# Patient Record
Sex: FEMALE | Employment: UNEMPLOYED | URBAN - METROPOLITAN AREA
[De-identification: names, ages, dates, MRNs, and addresses within clinical notes are randomized per-mention and may not be internally consistent; named-entity substitution may affect disease eponyms.]

---

## 2020-09-28 ENCOUNTER — VIRTUAL VISIT (OUTPATIENT)
Dept: FAMILY MEDICINE CLINIC | Age: 35
End: 2020-09-28
Payer: COMMERCIAL

## 2020-09-28 PROCEDURE — G8428 CUR MEDS NOT DOCUMENT: HCPCS | Performed by: NURSE PRACTITIONER

## 2020-09-28 PROCEDURE — 99205 OFFICE O/P NEW HI 60 MIN: CPT | Performed by: NURSE PRACTITIONER

## 2020-09-28 ASSESSMENT — ENCOUNTER SYMPTOMS
ABDOMINAL DISTENTION: 0
COLOR CHANGE: 0
CHOKING: 0
BLOOD IN STOOL: 0
CHEST TIGHTNESS: 0
DIARRHEA: 1
FACIAL SWELLING: 0
STRIDOR: 0
SINUS PRESSURE: 1
WHEEZING: 0
VOICE CHANGE: 0
RHINORRHEA: 1
ABDOMINAL PAIN: 0
PHOTOPHOBIA: 1
SINUS PAIN: 1
NAUSEA: 1
EYE DISCHARGE: 0
EYE REDNESS: 0
RECTAL PAIN: 0
SORE THROAT: 0
COUGH: 0
EYE PAIN: 0
SHORTNESS OF BREATH: 0
VOMITING: 1
TROUBLE SWALLOWING: 1
APNEA: 0
RESPIRATORY NEGATIVE: 1
BACK PAIN: 0
EYE ITCHING: 0
CONSTIPATION: 0
ANAL BLEEDING: 0

## 2020-09-28 NOTE — PROGRESS NOTES
2020    TELEHEALTH EVALUATION -- Audio/Visual (During FZWK[facility]-75 public health emergency)    HPI:    Vinnielizett Lu (:  1985) has requested an audio/video evaluation for the following concern(s):establish care    Ms Concepcion Alvarez presents today w multiple problems. She does not have a primary provider and instead has sought care thru the ED or clinics. In  she underwent gastroparesis and unfortunately did not follow thru with any aftercare. Admittedly she dies not take good care of her health. Post procedure she developed diabetes and has had BS in the high 400's. She borjas not check her sugars at home because she does not like needles. IN fact she does not like doctors. During the time shortly after the gastro she had to have her gallbladder removed and her teethe were falling out. Poor nutrition played a large part of this. She is a non-smoker  She has also had vision problems most likely form the diabetes which is not controlled. Never went to an opthalmology. She has N/v/d when she takes in any Mil products, both lactose or almond. She becomes violently ill. She develops what sounds like fungal rashes around her bikini line, buttocks and up the side of her neck. She puts neosporin cr on the rash and it lightens up then she covers it with make up. She has been coughing up white matter form the back of her throat. She was told she needed her tosills out never had the surgery and since has had this white matter comng from her throat. She tells me the back of her throat has\"a bunch of holes in it\"  She has had a tubal ligation.'  She was diagnosed w bi-polar disorder and given Seroquel and slept for 3 days. When she woke she threw the medicine away. She states she can't sleep like that w three children    Review of Systems   Constitutional: Positive for fatigue. Negative for activity change, appetite change, chills, diaphoresis, fever and unexpected weight change.         Gastroparesis \"06   HENT: hallucinations, self-injury, sleep disturbance and suicidal ideas. The patient is not nervous/anxious and is not hyperactive. Bi-polar disorder can't take seroquel       Prior to Visit Medications    Not on File       Social History     Tobacco Use    Smoking status: Not on file   Substance Use Topics    Alcohol use: Not on file    Drug use: Not on file            PHYSICAL EXAMINATION:  [ INSTRUCTIONS:  \"[x]\" Indicates a positive item  \"[]\" Indicates a negative item  -- DELETE ALL ITEMS NOT EXAMINED]  Vital Signs: (As obtained by patient/caregiver or practitioner observation)    Blood pressure-  Heart rate-    Respiratory rate-    Temperature-  Pulse oximetry-     Constitutional: [x] Appears well-developed and well-nourished [] No apparent distress      [] Abnormal-   Mental status  [x] Alert and awake  [x] Oriented to person/place/time [x]Able to follow commands      Eyes:  EOM    [x]  Normal  [] Abnormal-  Sclera  [x]  Normal  [] Abnormal -         Discharge [x]  None visible  [] Abnormal -    HENT:   [x] Normocephalic, atraumatic. [] Abnormal   [] Mouth/Throat: Mucous membranes are moist.     External Ears [x] Normal  [] Abnormal-     Neck: [] No visualized mass     Pulmonary/Chest: [x] Respiratory effort normal.  [x] No visualized signs of difficulty breathing or respiratory distress        [] Abnormal-      Musculoskeletal:   [] Normal gait with no signs of ataxia         [x] Normal range of motion of neck        [] Abnormal-       Neurological:        [] No Facial Asymmetry (Cranial nerve 7 motor function) (limited exam to video visit)          [x] No gaze palsy        [] Abnormal-         Skin:        [x] No significant exanthematous lesions or discoloration noted on facial skin         [] Abnormal-            Psychiatric:       [x] Normal Affect [] No Hallucinations        [] Abnormal-     Other pertinent observable physical exam findings-     ASSESSMENT/PLAN:  1. Establish care/physical/labs  Mammogram, Tdap and flu shot  2. Diabetes-start on Metformin awaiting lab result  Needs Opthamology referral as well as podiatry  3. Poor dentition-dentist  4. Spitting white matter from throat, tonsils infected? Needs ref to ENT  5. N/v/d after any milk product even almond milk, doesn't know what to eat after gastroparesis  Needs ref to GI  6. Possible fungal infection bikini  Line and buttocks-need ov to observe, may need cultures and antifungal  7. Bi-polar disorder-unable to tolerate Seroquel 2/2 to sedation need to find one that has less sedating properties    Spoke to pt about the referrals as well as making an appointment for a physical and lab work    Zina Nageotte is a 38 y.o. female being evaluated by a Virtual Visit (video visit) encounter to address concerns as mentioned above. A caregiver was present when appropriate. Due to this being a TeleHealth encounter (During [facility] public health emergency), evaluation of the following organ systems was limited: Vitals/Constitutional/EENT/Resp/CV/GI//MS/Neuro/Skin/Heme-Lymph-Imm. Pursuant to the emergency declaration under the [address] [place] [address] authority and the [employer] and [employer] Act, this Virtual Visit was conducted with patient's (and/or legal guardian's) consent, to reduce the patient's risk of exposure to COVID-19 and provide necessary medical care. The patient (and/or legal guardian) has also been advised to contact this office for worsening conditions or problems, and seek emergency medical treatment and/or call 911 if deemed necessary. Patient identification was verified at the start of the visit: Yes    Total time spent on this encounter: 39    Services were provided through a video synchronous discussion virtually to substitute for in-person clinic visit. Patient and provider were located at their individual homes.     --Gemma Larkin Rona Noland - CNP on 9/28/2020 at 12:19 PM    An electronic signature was used to authenticate this note.

## 2021-06-09 ENCOUNTER — HOSPITAL ENCOUNTER (EMERGENCY)
Age: 36
Discharge: HOME | End: 2021-06-09
Payer: COMMERCIAL

## 2021-06-09 VITALS
RESPIRATION RATE: 14 BRPM | TEMPERATURE: 98.5 F | DIASTOLIC BLOOD PRESSURE: 66 MMHG | HEART RATE: 88 BPM | OXYGEN SATURATION: 100 % | SYSTOLIC BLOOD PRESSURE: 142 MMHG

## 2021-06-09 DIAGNOSIS — N89.8 VAGINAL IRRITATION: Primary | ICD-10-CM

## 2021-06-09 DIAGNOSIS — R10.2 VULVAR PAIN: ICD-10-CM

## 2021-06-09 LAB
BACTERIA WET PREP: NORMAL
CLUE CELLS: NORMAL
EPITHELIAL CELLS WET PREP: NORMAL
RBC WET PREP: NORMAL
SOURCE WET PREP: NORMAL
TRICHOMONAS PREP: NORMAL
WBC WET PREP: NORMAL
YEAST WET PREP: NORMAL

## 2021-06-09 PROCEDURE — 6370000000 HC RX 637 (ALT 250 FOR IP): Performed by: NURSE PRACTITIONER

## 2021-06-09 PROCEDURE — 87591 N.GONORRHOEAE DNA AMP PROB: CPT

## 2021-06-09 PROCEDURE — 87210 SMEAR WET MOUNT SALINE/INK: CPT

## 2021-06-09 PROCEDURE — 87491 CHLMYD TRACH DNA AMP PROBE: CPT

## 2021-06-09 PROCEDURE — 6360000002 HC RX W HCPCS: Performed by: NURSE PRACTITIONER

## 2021-06-09 PROCEDURE — 96372 THER/PROPH/DIAG INJ SC/IM: CPT

## 2021-06-09 PROCEDURE — 99284 EMERGENCY DEPT VISIT MOD MDM: CPT

## 2021-06-09 RX ORDER — DOXYCYCLINE HYCLATE 100 MG
100 TABLET ORAL ONCE
Status: COMPLETED | OUTPATIENT
Start: 2021-06-09 | End: 2021-06-09

## 2021-06-09 RX ORDER — DOXYCYCLINE 100 MG/1
100 TABLET ORAL 2 TIMES DAILY
Qty: 14 TABLET | Refills: 0 | Status: SHIPPED | OUTPATIENT
Start: 2021-06-09 | End: 2021-06-16

## 2021-06-09 RX ORDER — CEFTRIAXONE 1 G/1
500 INJECTION, POWDER, FOR SOLUTION INTRAMUSCULAR; INTRAVENOUS ONCE
Status: COMPLETED | OUTPATIENT
Start: 2021-06-09 | End: 2021-06-09

## 2021-06-09 RX ADMIN — CEFTRIAXONE SODIUM 500 MG: 1 INJECTION, POWDER, FOR SOLUTION INTRAMUSCULAR; INTRAVENOUS at 22:27

## 2021-06-09 RX ADMIN — DOXYCYCLINE HYCLATE 100 MG: 100 TABLET, COATED ORAL at 22:27

## 2021-06-09 ASSESSMENT — PAIN DESCRIPTION - ONSET: ONSET: GRADUAL

## 2021-06-09 ASSESSMENT — PAIN DESCRIPTION - PAIN TYPE: TYPE: ACUTE PAIN

## 2021-06-09 ASSESSMENT — PAIN DESCRIPTION - PROGRESSION: CLINICAL_PROGRESSION: NOT CHANGED

## 2021-06-09 ASSESSMENT — PAIN DESCRIPTION - FREQUENCY: FREQUENCY: CONTINUOUS

## 2021-06-09 ASSESSMENT — PAIN DESCRIPTION - LOCATION: LOCATION: VAGINA

## 2021-06-09 ASSESSMENT — PAIN SCALES - GENERAL: PAINLEVEL_OUTOF10: 6

## 2021-06-10 LAB
C TRACH DNA GENITAL QL NAA+PROBE: NEGATIVE
N. GONORRHOEAE DNA: NEGATIVE

## 2021-06-10 PROCEDURE — 87491 CHLMYD TRACH DNA AMP PROBE: CPT

## 2021-06-10 PROCEDURE — 87591 N.GONORRHOEAE DNA AMP PROB: CPT

## 2021-06-10 NOTE — ED NOTES
Assisted [facility] NP at bedside with Pelvic Exam.     Kirk Stanton, AMANDAN  59/71/41 [id number]

## 2021-06-10 NOTE — ED NOTES
Pt scripts x1 instructed to follow up with PCP. Assessed per Nikki Aviles NP.      Jalen Darnell, JOHNSON  01/90/02 8918

## 2021-06-10 NOTE — ED PROVIDER NOTES
Evaluated by [id number] [facility] Provider    [facility]  ED    CHIEF COMPLAINT  Vaginal Itching (Pt reports buying a new body wash and deoderant for her vaginal and woke up this morning with \"blisters\" and itching/ burning on her vaginal.) and Other    HISTORY OF PRESENT ILLNESS  Nisa Royal is a 38 y.o. female who presents to the ED complaining of vaginal itching and pain. She got new body wash and new \"who ha\" spray 3 days ago, it started burning when she was using the body wash, it started to burn and she rinsed it off quickly, she used the vaginal spray and it burned as well. She finally looked at it this morning because it hurt to walk and wipe. She looked at it today and it looks like there are blisters. She is also having itching. Denies vaginal discharge. Thought she had a yeast infection and used a monistat suppository and the insertion device had blood on it and discharge, she has just gotten off her period. She is concerned for STI. Denies abdominal pain. The patient is currently rating their pain as 6/10 and describes it as an burning and itching type of pain. Treatments tried prior to arrival in the ED: above. The patient arrived to the ED via private car. PAST MEDICAL HISTORY    History reviewed. No pertinent past medical history. SURGICAL HISTORY    History reviewed. No pertinent surgical history. CURRENT MEDICATIONS        ALLERGIES    No Known Allergies    FAMILY HISTORY    History reviewed. No pertinent family history.     SOCIAL HISTORY    Social History     Socioeconomic History    Marital status: Single     Spouse name: None    Number of children: None    Years of education: None    Highest education level: None   Occupational History    None   Tobacco Use    Smoking status: Never Smoker    Smokeless tobacco: Never Used   Substance and Sexual Activity    Alcohol use: Not Currently    Drug use: Not Currently    Sexual activity: Yes     Partners: Male Other Topics Concern    None   Social History Narrative    None     Social Determinants of Health     Financial Resource Strain:     Difficulty of Paying Living Expenses:    Food Insecurity:     Worried About Running Out of Food in the Last Year:     [address] in the Last Year:    Transportation Needs:     Lack of Transportation (Medical):  Lack of Transportation (Non-Medical):    Physical Activity:     Days of Exercise per Week:     Minutes of Exercise per Session:    Stress:     Feeling of Stress :    Social Connections:     Frequency of Communication with Friends and Family:     Frequency of Social Gatherings with Friends and Family:     Attends Zoroast[employer] Services:     Active Member of Clubs or Organizations:     Attends Club or Organization Meetings:     Marital Status:    Intimate Partner Violence:     Fear of Current or Ex-Partner:     Emotionally Abused:     Physically Abused:     Sexually Abused:        REVIEW OFSYSTEMS    10systems reviewed, pertinent positives per HPI otherwise noted to be negative. PHYSICAL EXAM  Physical Exam  Vitals:    06/09/21 2122   BP: (!) 142/66   Pulse: 88   Resp:    Temp:    SpO2: 100%     GENERAL: Patient is well-developed, well-nourished. Awake andalert. Cooperative. Resting in bed. No apparent distress. HEENT:  Normocephalic, atraumatic. Conjunctivaappear normal. Sclera is non-icteric. External ears are normal.    NECK: Supple with normal ROM. Tracheamidline  LUNGS: Equal and symmetric chest rise. Breathing is unlabored. Speaking comfortably in fullsentences. Lungs are clear bilaterally to auscultation. Without wheezing, rales, or rhonchi. CADIOVASCULAR:  Regular rate and rhythm. Normal S1-S2 sounds. No murmurs, rubs, or gallops. GI: Soft, non-tender to palpation, nondistended with positive bowel sounds. No rebound tenderness, guarding or rigidity. Negative Yanez's sign. Negative Rovsing's sign. No CVAT to palpation.  No masses or hepatosplenomegaly to palpation. : Pelvic exam: VULVA: normal appearing vulva with no masses, tenderness or lesions, VAGINA: color appears normal, thin white vaginal discharge, vaginal lesion there are scattered ulcers noted to the mucosal tissue at the vaginal introitus, WET MOUNT done - results: negative for pathogens, normal epithelial cells, CERVIX: normal appearing cervix without discharge or lesions, DNA probe for chlamydia and GC obtained. Bimanual not performed due to patient's level of pain with speculum exam.  MUSCULOSKELETAL:  No gross deformities or trauma noted. Moving all extremities equally and appropriately. Normal ROM. SKIN: Warm/dry. Skin is intact. No rashes or lesions noted. PSYCHIATRIC: Mood and affect appropriate. Speech is clear and articulate. NEUROLOGIC: Alert and oriented. No focal motor or sensory deficits. Gait was observed and is normal.    LABS   Results for orders placed or performed during the hospital encounter of 06/09/21   Wet prep, genital    Specimen: Vaginal   Result Value Ref Range    Trichomonas Prep None Seen     Yeast, Wet Prep None Seen     Clue Cells, Wet Prep None Seen     WBC, Wet Prep <1+     RBC, Wet Prep None Seen     Epi Cells 1+     Bacteria 2+     Source Wet Prep Vaginal        RADIOLOGY    No results found. ED COURSE/MDM  Patient seen and evaluated. Old records reviewed. Diagnostic testing reviewed and results discussed. I have evaluated this patient. My supervising physician was available for consultation. Merline Hernandez presented to the ED today with above noted complaints. Physical exam did not reveal reproducible abdominal tenderness. On pelvic exam she did appear to have small ulcerations noted to the mucosal tissue at the vaginal introitus. There was a thin white vaginal discharge. Cervix appears unremarkable. I did obtain wet prep and swab for gonorrhea and chlamydia. Patient stated she wanted to be tested for STIs.   Wet prep was negative. I do not feel that patient's appearance is consistent with herpes infection as she did not have any lesions to the vulva. I think this is more related to a contact dermatitis from the body spray and baths gel that she used. I discussed with the patient that she could do sits baths or use a squirt bottle with warm water to the area to help with pain control. I advised her on only using water and not sitting in a tub or bath and water that is not moving. I also told her not to use any type of chemical or soaps to the area and only use water until her symptoms have resolved. I feel that this will be self-limiting and resolve on its own. I also instructed the patient that she could try a zinc-based diaper rash cream as this would be protecting to the areas that are painful. She does not currently have a gynecologist and I did give her a referral upon discharge and advised her to follow-up with them if her symptoms or not improving. Patient verbalized her agreement and understanding of this plan. Patient did warrant prophylactic treatment for STI, she was given this prior to discharge. Pt was given the following medications or treatments in the ED:   Medications   cefTRIAXone (ROCEPHIN) injection 500 mg (500 mg Intramuscular Given 6/9/21 2227)   doxycycline hyclate (VIBRA-TABS) tablet 100 mg (100 mg Oral Given 6/9/21 2227)       At this point I do not feel the patient requires further work upand it is reasonable to discharge the patient. Please refer to AVS for further details regarding discharge instructions. A discussion was had with the patient regarding diagnosis, diagnostic testing results,treatment/ plan of care, and follow up. All questions were answered. Patient will follow up as directed for further evaluation/treatment. The patient was given strict return precautions as we discussed symptoms that wouldnecessitate return to the ED.  Patient will return to ED for new/worsening symptoms. The patient verbalized their understanding and agreement with the above plan. Patient was sent home with a prescription for below medication/s. I did [place] patient on appropriate use of these medication. Discharge Medication List as of 6/9/2021 10:23 PM      START taking these medications    Details   doxycycline monohydrate (ADOXA) 100 MG tablet Take 1 tablet by mouth 2 times daily for 7 days, Disp-14 tablet, R-0Print             I estimatethere is LOW risk for ACUTE APPENDICITIS, BOWEL OBSTRUCTION, CHOLECYSTITIS, DIVERTICULITIS, INCARCERATED HERNIA, PANCREATITIS, or PERFORATED BOWEL or ULCER, thus I consider the discharge disposition reasonable. Also, thereis no evidence or peritonitis, sepsis, or toxicity. Fei Renteria and I have discussed the diagnosis and risks, and we agree with discharging home to follow-up with their primary doctor. We also discussed returning to the EmergencyDepartment immediately if new or worsening symptoms occur. We have discussed the symptoms which are most concerning (e.g., bloody stool, fever, changing or worsening pain, vomiting) that necessitate immediate return. CLINICAL IMPRESSION    1. Vaginal irritation    2. Vulvar pain           Discharge Vitals:  Blood pressure (!) 142/66, pulse 88, temperature 98.5 °F (36.9 °C), temperature source Oral, resp. rate 14, last menstrual period 06/01/2021, SpO2 100 %. FOLLOW UP  Rogers Salmon DO  [address] [place]mery Hernandes [address] [place] [address]  [phone]    Call in 1 day  For further evaluation    [facility] [address] [phone]  Go to   If symptoms worsen      DISPOSITION  Patient was discharged to home in good condition.     Comment: Pleasenote this report has been produced using speech recognition software and may contain errors related to that system including errors in grammar, punctuation, and spelling, as well as words and phrases that may beinappropriate. If there are any questions or concerns please feel free to contact the dictating provider for clarification.        RICHARDSON Billingsley - CNP  06/09/21 9699

## 2022-03-28 ENCOUNTER — HOSPITAL ENCOUNTER (EMERGENCY)
Age: 37
Discharge: HOME | End: 2022-03-28
Payer: COMMERCIAL

## 2022-03-28 VITALS
WEIGHT: 135 LBS | SYSTOLIC BLOOD PRESSURE: 139 MMHG | HEART RATE: 104 BPM | DIASTOLIC BLOOD PRESSURE: 88 MMHG | OXYGEN SATURATION: 100 % | HEIGHT: 65 IN | BODY MASS INDEX: 22.49 KG/M2 | RESPIRATION RATE: 24 BRPM | TEMPERATURE: 97.8 F

## 2022-03-28 DIAGNOSIS — J45.20 INTERMITTENT ASTHMA WITHOUT COMPLICATION, UNSPECIFIED ASTHMA SEVERITY: Primary | ICD-10-CM

## 2022-03-28 PROCEDURE — 99285 EMERGENCY DEPT VISIT HI MDM: CPT

## 2022-03-28 RX ORDER — ALBUTEROL SULFATE 90 UG/1
2 AEROSOL, METERED RESPIRATORY (INHALATION) 4 TIMES DAILY PRN
Qty: 18 G | Refills: 0 | Status: SHIPPED | OUTPATIENT
Start: 2022-03-28

## 2022-03-28 RX ORDER — ALBUTEROL SULFATE 90 UG/1
2 AEROSOL, METERED RESPIRATORY (INHALATION) EVERY 6 HOURS PRN
COMMUNITY

## 2022-03-28 RX ORDER — PREDNISONE 20 MG/1
40 TABLET ORAL DAILY
Qty: 10 TABLET | Refills: 0 | Status: SHIPPED | OUTPATIENT
Start: 2022-03-28 | End: 2022-04-02

## 2022-03-28 ASSESSMENT — ENCOUNTER SYMPTOMS
TROUBLE SWALLOWING: 0
WHEEZING: 1
ABDOMINAL PAIN: 0
COUGH: 0
SINUS PRESSURE: 0
NAUSEA: 0
SHORTNESS OF BREATH: 0
VOMITING: 0
CHEST TIGHTNESS: 0
DIARRHEA: 0

## 2022-03-28 NOTE — ED PROVIDER NOTES
**ADVANCED PRACTICE PROVIDER, I HAVE EVALUATED THIS [facility]  ED  EMERGENCY DEPARTMENT ENCOUNTER      Pt Name: Jack Hicks  [facility]:[id number]  [place] 1985  Date of evaluation: 3/28/2022  Provider: DEBRA Zamarripa      Chief Complaint:    Chief Complaint   Patient presents with    Shortness of Breath         Nursing Notes, Past Medical Hx, Past Surgical Hx, Social Hx, Allergies, and Family Hx were all reviewed and agreed with or any disagreements were addressed in the HPI.    HPI: (Location, Duration, Timing, Severity, Quality, Assoc Sx, Context, Modifying factors)    Chief Complaint of asthma attack. This is a  39 y.o. female who presents via EMS due to an asthma attack. She states this morning she woke up and felt like she was wheezing, she went to use her albuterol inhaler however her inhaler malfunctioned and she was unable to get any treatment. She states she started \"freaking out\" which worsened her symptoms. She then called 911, she was given 60 mg of prednisone and around and a nebulizer treatment. She now feels back to baseline. She denies any shortness of breath, chest pain or wheezing at this time. No recent fevers or chills. She states she was really sick in December and since then has required her inhaler 1-2 times weekly. She states she did recently moved here and does not have a primary care physician yet. PastMedical/Surgical History:      Diagnosis Date    Asthma          Procedure Laterality Date    CHOLECYSTECTOMY      GASTRIC BYPASS SURGERY      TUBAL LIGATION         Medications:  Discharge Medication List as of 3/28/2022  1:28 PM      CONTINUE these medications which have NOT CHANGED    Details   !! albuterol sulfate HFA (VENTOLIN HFA) 108 (90 Base) MCG/ACT inhaler Inhale 2 puffs into the lungs every 6 hours as needed for WheezingHistorical Med       !! - Potential duplicate medications found. Please discuss with provider. Review of Systems:  (2-9 systems needed)  Review of Systems   Constitutional: Negative for chills, fatigue and fever. HENT: Negative for congestion, postnasal drip, sinus pressure, tinnitus and trouble swallowing. Respiratory: Positive for wheezing (prior to arrival). Negative for cough, chest tightness and shortness of breath. Cardiovascular: Negative for chest pain and palpitations. Gastrointestinal: Negative for abdominal pain, diarrhea, nausea and vomiting. Skin: Negative for rash. Neurological: Negative for dizziness, weakness, light-headedness and headaches. Physical Exam:  Physical Exam  Vitals and nursing note reviewed. Constitutional:       Appearance: Normal appearance. She is not diaphoretic. HENT:      Head: Normocephalic and atraumatic. Nose: Nose normal.   Eyes:      General:         Right eye: No discharge. Left eye: No discharge. Cardiovascular:      Rate and Rhythm: Normal rate and regular rhythm. Pulses: Normal pulses. Heart sounds: Normal heart sounds. No murmur heard. No friction rub. No gallop. Pulmonary:      Effort: Pulmonary effort is normal. No respiratory distress. Breath sounds: Normal breath sounds. No stridor. No wheezing, rhonchi or rales. Musculoskeletal:         General: Normal range of motion. Cervical back: Normal range of motion and neck supple. Skin:     General: Skin is warm and dry. Coloration: Skin is not pale. Neurological:      Mental Status: She is alert and oriented to person, place, and time.    Psychiatric:         Mood and Affect: Mood normal.         Behavior: Behavior normal.         MEDICAL DECISION MAKING    Vitals:    Vitals:    03/28/22 1226   BP: 139/88   Pulse: 104   Resp: 24   Temp: 97.8 °F (36.6 °C)   TempSrc: Oral   SpO2: 100%   Weight: 135 lb (61.2 kg)   Height: 5' 5\" (1.651 m)       LABS:Labs Reviewed - No data to display     Remainder of labs reviewed and were negative at this time or not returned at the time of this note. RADIOLOGY:   Non-plain film images such as CT, Ultrasound and MRI are read by the radiologist. DEBRA Choe have directly visualized the radiologic plain film image(s) with the below findings:      Interpretation per the Radiologist below, if available at the time of this note:    No orders to display        No results found. MEDICAL DECISION MAKING / ED COURSE:      Patient was evaluated in the emergency department today for acute asthma attack. She is back to baseline at the time of my evaluation. There is no wheezing, rales or rhonchi. She is requesting albuterol prescription as her albuterol inhaler malfunction today. She is hemodynamically stable at triage although is very slightly tachycardic and I suspect this is due to the patient receiving nebulizer treatment prior to arrival.  She will also be prescribed short course of steroid for the acute asthma exacerbation. She will be provided with primary care referral for close follow-up. We discussed very strict return precautions should she develop any new or worsening symptoms she will immediately return to the emergency department. The patient is in agreement with treatment plan and ultimately discharged home in good condition. The patient tolerated their visit well. I evaluated the patient. The physician was available for consultation as needed. The patient and / or the family were informed of the results of any tests, a time was given to answer questions, a plan was proposed and they agreed with plan. CLINICAL IMPRESSION:  1. Intermittent asthma without complication, unspecified asthma severity      I estimate there is LOW risk for AIRWAY COMPROMISE, ANAPHYLAXIS, CELLULITIS, EPIGLOTTITIS, or NECROTIZING FASCIITIS, thus I consider the discharge disposition reasonable. Also, there is no evidence or peritonitis, sepsis, or toxicity.  Inga Cushing and I have discussed the diagnosis and risks, and we agree with discharging home to follow-up with their primary doctor. We also discussed returning to the Emergency Department immediately if new or worsening symptoms occur. We have discussed the symptoms which are most concerning (e.g., bloody stool, fever, changing or worsening pain, vomiting) that necessitate immediate return. FINAL Impression  1. Intermittent asthma without complication, unspecified asthma severity        Blood pressure 139/88, pulse 104, temperature 97.8 °F (36.6 °C), temperature source Oral, resp. rate 24, height 5' 5\" (1.651 m), weight 135 lb (61.2 kg), last menstrual period 02/28/2022, SpO2 100 %. DISPOSITION Decision To Discharge 03/28/2022 12:52:57 PM      PATIENT REFERRED TO:  [facility]  ED  [facility] [address]  Go to   If symptoms worsen    [address]  [address]  Schedule an appointment as soon as possible for a visit         DISCHARGE MEDICATIONS:  Discharge Medication List as of 3/28/2022  1:28 PM      START taking these medications    Details   !! albuterol sulfate HFA (VENTOLIN HFA) 108 (90 Base) MCG/ACT inhaler Inhale 2 puffs into the lungs 4 times daily as needed for Wheezing, Disp-18 g, R-0Normal      predniSONE (DELTASONE) 20 MG tablet Take 2 tablets by mouth daily for 5 days, Disp-10 tablet, R-0Normal       !! - Potential duplicate medications found. Please discuss with provider.           DISCONTINUED MEDICATIONS:  Discharge Medication List as of 3/28/2022  1:28 PM                 (Please note the MDM and HPI sections of this note were completed with a voice recognition program.  Efforts were made to edit the dictations but occasionally words are mis-transcribed.)    Electronically signed, Earlene Escalera,           Earlene Escalera  03/28/22 2793

## 2022-03-28 NOTE — ED TRIAGE NOTES
Pt reports having Asthma \"attack\" at home and went use her Albuterol inhaler which shows that she still has medication, however reports that it \"malfunctioned\" resulting in pt \"had to call 911 and they fixed me\". Pt reports requests a new inhaler Rx and requests no further treatment at this time. BrSo clear with mild expiratory wheezes Left fields.